# Patient Record
Sex: MALE | Race: WHITE | NOT HISPANIC OR LATINO | Employment: FULL TIME | URBAN - NONMETROPOLITAN AREA
[De-identification: names, ages, dates, MRNs, and addresses within clinical notes are randomized per-mention and may not be internally consistent; named-entity substitution may affect disease eponyms.]

---

## 2023-06-19 ENCOUNTER — APPOINTMENT (OUTPATIENT)
Dept: PRIMARY CARE | Facility: CLINIC | Age: 53
End: 2023-06-19

## 2024-09-23 ENCOUNTER — OFFICE VISIT (OUTPATIENT)
Dept: URGENT CARE | Facility: URGENT CARE | Age: 54
End: 2024-09-23
Payer: COMMERCIAL

## 2024-09-23 VITALS
RESPIRATION RATE: 18 BRPM | DIASTOLIC BLOOD PRESSURE: 92 MMHG | BODY MASS INDEX: 30.37 KG/M2 | WEIGHT: 208.56 LBS | SYSTOLIC BLOOD PRESSURE: 146 MMHG | HEART RATE: 94 BPM | OXYGEN SATURATION: 100 % | TEMPERATURE: 98.8 F

## 2024-09-23 DIAGNOSIS — J01.00 ACUTE NON-RECURRENT MAXILLARY SINUSITIS: ICD-10-CM

## 2024-09-23 DIAGNOSIS — J40 BRONCHITIS: Primary | ICD-10-CM

## 2024-09-23 DIAGNOSIS — I10 HYPERTENSION, UNSPECIFIED TYPE: ICD-10-CM

## 2024-09-23 RX ORDER — TRAZODONE HYDROCHLORIDE 50 MG/1
50-100 TABLET ORAL NIGHTLY
COMMUNITY

## 2024-09-23 RX ORDER — AMOXICILLIN 875 MG/1
875 TABLET, FILM COATED ORAL 2 TIMES DAILY
Qty: 20 TABLET | Refills: 0 | Status: SHIPPED | OUTPATIENT
Start: 2024-09-23 | End: 2024-10-03

## 2024-09-23 RX ORDER — DIVALPROEX SODIUM 250 MG/1
TABLET, FILM COATED, EXTENDED RELEASE ORAL
COMMUNITY

## 2024-09-23 NOTE — PROGRESS NOTES
Subjective   Patient ID: Derek Ardon is a 54 y.o. male. They present today with a chief complaint of Cough (X 1 week).    History of Present Illness  HPI  Pt presents for persistent dry cough day and night, occasional SOB with spasms of cough. No wheeze or CP or fever or N/V/D. Pt has tried OTC advil cold and sinus, dayquil, nyquil with temporary relief.     Pt reports hx of hypertension stopped lisinopril 2-3 years ago due to dry cough. He has not gone back to PCP to discuss HTN management. He is taking care of his 93yo father and teenage children with increased stress in his life. He denies tobacco or alcohol use.    Past Medical History  Allergies as of 09/23/2024    (No Known Allergies)       (Not in a hospital admission)       Past Medical History:   Diagnosis Date    Bipolar disorder, current episode manic without psychotic features, unspecified (Multi)     Bipolar disorder, current episode manic without psychotic features    Body mass index (BMI) 31.0-31.9, adult 06/20/2022    BMI 31.0-31.9,adult    Obesity, unspecified 04/21/2022    Class 1 obesity with body mass index (BMI) of 30.0 to 30.9 in adult    Other conditions influencing health status     Herniated disc       Past Surgical History:   Procedure Laterality Date    OTHER SURGICAL HISTORY  10/05/2015    Excision Of Baker's Cyst            Review of Systems  Review of Systems                               Objective    Vitals:    09/23/24 1428   BP: (!) 146/92   Pulse: 94   Resp: 18   Temp: 37.1 °C (98.8 °F)   SpO2: 100%   Weight: 94.6 kg (208 lb 8.9 oz)     No LMP for male patient.    Physical Exam  Vitals and nursing note reviewed.   Constitutional:       Appearance: Normal appearance.      Comments: Pleasant white male, obese body habitus   HENT:      Head: Normocephalic and atraumatic.      Right Ear: Tympanic membrane, ear canal and external ear normal.      Left Ear: Tympanic membrane, ear canal and external ear normal.      Nose: Congestion and  rhinorrhea present.      Mouth/Throat:      Mouth: Mucous membranes are moist.      Pharynx: Posterior oropharyngeal erythema present.      Comments: Tonsils absent  Eyes:      Extraocular Movements: Extraocular movements intact.      Conjunctiva/sclera: Conjunctivae normal.      Pupils: Pupils are equal, round, and reactive to light.   Cardiovascular:      Rate and Rhythm: Normal rate and regular rhythm.      Heart sounds: No murmur heard.  Pulmonary:      Effort: Pulmonary effort is normal.      Breath sounds: Normal breath sounds.   Musculoskeletal:         General: Normal range of motion.      Cervical back: Normal range of motion and neck supple.   Skin:     General: Skin is warm and dry.      Findings: No rash.   Neurological:      General: No focal deficit present.      Mental Status: He is alert and oriented to person, place, and time.   Psychiatric:         Mood and Affect: Mood normal.         Procedures    Point of Care Test & Imaging Results from this visit  No results found for this visit on 09/23/24.   No results found.    Diagnostic study results (if any) were reviewed by Amber Fairbanks PA-C.    Assessment/Plan   Allergies, medications, history, and pertinent labs/EKGs/Imaging reviewed by Amber Fairbanks PA-C.     Orders and Diagnoses  Diagnoses and all orders for this visit:  Bronchitis  -Reviewed supportive care with otc expectorant such as coricidin HBP, increase in fluids and vicks chest rub.  -Return if not improving for CXR. GO TO ER if worsening difficulty breathing.  Acute non-recurrent maxillary sinusitis  -     amoxicillin (Amoxil) 875 mg tablet; Take 1 tablet (875 mg) by mouth 2 times a day for 10 days.   -Advised to take with food and probiotic. Trial of flonase nasal spray 1 spray twice a day x 3-7 days  Hypertension, unspecified type   - reviewed lifestyle modification with stress management, low sodium diet and follow up with PCP for med management      Patient  disposition: Home    Electronically signed by Amber Fairbanks PA-C  3:00 PM

## 2024-09-23 NOTE — PATIENT INSTRUCTIONS
Acute sinusitis- Start Amoxicillin twice a day x 10 days, take with food and probiotic. Return for another course of antibiotic if nasal congestion not completely resolved.     Bronchitis- Recommend OTC expectorant (Coricidin HBP if elevated BP) or Mucinex or Robitussin with plenty of fluids. Trial of Flonase 1 spray each nostril twice a day x 3-7 days.     Sore throat- Recommend warm salt water gargles, saline nasal spray every 2 hours as needed congestion, Humidifier, Vicks Chest RUB

## 2024-09-28 ENCOUNTER — OFFICE VISIT (OUTPATIENT)
Dept: URGENT CARE | Facility: URGENT CARE | Age: 54
End: 2024-09-28
Payer: COMMERCIAL

## 2024-09-28 ENCOUNTER — HOSPITAL ENCOUNTER (OUTPATIENT)
Dept: RADIOLOGY | Facility: CLINIC | Age: 54
Discharge: HOME | End: 2024-09-28
Payer: COMMERCIAL

## 2024-09-28 VITALS
WEIGHT: 210.76 LBS | TEMPERATURE: 98.7 F | BODY MASS INDEX: 30.69 KG/M2 | SYSTOLIC BLOOD PRESSURE: 138 MMHG | OXYGEN SATURATION: 98 % | DIASTOLIC BLOOD PRESSURE: 90 MMHG | HEART RATE: 99 BPM | RESPIRATION RATE: 18 BRPM

## 2024-09-28 DIAGNOSIS — J20.9 BRONCHOSPASM WITH BRONCHITIS, ACUTE: Primary | ICD-10-CM

## 2024-09-28 DIAGNOSIS — J01.00 ACUTE NON-RECURRENT MAXILLARY SINUSITIS: ICD-10-CM

## 2024-09-28 DIAGNOSIS — J20.9 BRONCHOSPASM WITH BRONCHITIS, ACUTE: ICD-10-CM

## 2024-09-28 DIAGNOSIS — H10.13 ALLERGIC CONJUNCTIVITIS OF BOTH EYES: ICD-10-CM

## 2024-09-28 PROCEDURE — 71046 X-RAY EXAM CHEST 2 VIEWS: CPT | Performed by: RADIOLOGY

## 2024-09-28 PROCEDURE — 71046 X-RAY EXAM CHEST 2 VIEWS: CPT

## 2024-09-28 RX ORDER — METHYLPREDNISOLONE 4 MG/1
TABLET ORAL
Qty: 21 TABLET | Refills: 0 | Status: SHIPPED | OUTPATIENT
Start: 2024-09-28 | End: 2024-10-05

## 2024-09-28 RX ORDER — OLOPATADINE HYDROCHLORIDE 1 MG/ML
1 SOLUTION/ DROPS OPHTHALMIC 2 TIMES DAILY PRN
Qty: 5 ML | Refills: 0 | Status: SHIPPED | OUTPATIENT
Start: 2024-09-28 | End: 2025-01-26

## 2024-09-28 RX ORDER — ALBUTEROL SULFATE 90 UG/1
2 INHALANT RESPIRATORY (INHALATION) EVERY 6 HOURS PRN
Qty: 8.5 G | Refills: 0 | Status: SHIPPED | OUTPATIENT
Start: 2024-09-28 | End: 2025-09-28

## 2024-09-28 RX ORDER — AMOXICILLIN AND CLAVULANATE POTASSIUM 875; 125 MG/1; MG/1
1 TABLET, FILM COATED ORAL 2 TIMES DAILY
Qty: 20 TABLET | Refills: 0 | Status: SHIPPED | OUTPATIENT
Start: 2024-09-28 | End: 2024-10-08

## 2024-09-28 RX ORDER — ALBUTEROL SULFATE 0.83 MG/ML
2.5 SOLUTION RESPIRATORY (INHALATION) ONCE
Status: COMPLETED | OUTPATIENT
Start: 2024-09-28 | End: 2024-09-28

## 2024-09-28 ASSESSMENT — PAIN SCALES - GENERAL: PAINLEVEL: 0-NO PAIN

## 2024-09-28 NOTE — PATIENT INSTRUCTIONS
Acute Bronchospasm with Bronchitis- Albuterol neb once. Start Albuterol inhaler 2 puffs every 6 hours x 24 then as needed wheeze or cough, use with spacer (4 breaths between each puff). Start Medrol dose isaiah as directed, take with food. Avoid NSAIDS while on Medrol dose isaiah due to increased risk for elevated BP. Monitor BP daily, If BP>170/100 stop steroid taper and follow up with PCP. Chest xray ordered due to worsening cough.      Acute Sinusitis- Start Augmentin twice a day x 10 days, stop Amoxicillin 875mg. Recommend saline nasal spray, humidifier, vicks chest rub.    Acute allergy eyes- Start Pataday eye drop 1 drop each eye twice a day x 3 days then as needed for itchy eyes. Recommend otc zyrtec 10mg daily x 1 week. May use OTC vaseline and rub around eyes to help with irritation.

## 2024-09-28 NOTE — PROGRESS NOTES
Subjective   Patient ID: Derek Ardon is a 54 y.o. male. They present today with a chief complaint of Sore Throat, Cough, and Other (Pt. C/O chest congestion, SOB, cough, and yellow-mucus. /Was here 6 days ago./On Amox. BID-ordered for 10 days. ).    History of Present Illness  HPI  Pt was seen on 9/23/24 for similar sx of spasmatic cough, congestion. He was prescribed Amoxicillin 875mg BID and reports no relief of symptoms. He reports cough now is more productive with yellow sputum, SOB with coughing spasms and with exertion. He has noticed a wheeze at bedtime. No fever or vomiting in last 6 days since starting antibiotic. He has tried otc delsym cough syrup  alkaseltzer plus with temporary relief.     Past Medical History  Allergies as of 09/28/2024    (No Known Allergies)       (Not in a hospital admission)       Past Medical History:   Diagnosis Date    Bipolar disorder, current episode manic without psychotic features, unspecified (Multi)     Bipolar disorder, current episode manic without psychotic features    Body mass index (BMI) 31.0-31.9, adult 06/20/2022    BMI 31.0-31.9,adult    Obesity, unspecified 04/21/2022    Class 1 obesity with body mass index (BMI) of 30.0 to 30.9 in adult    Other conditions influencing health status     Herniated disc       Past Surgical History:   Procedure Laterality Date    OTHER SURGICAL HISTORY  10/05/2015    Excision Of Baker's Cyst        reports that he has never smoked. He has never been exposed to tobacco smoke. He has never used smokeless tobacco. He reports that he does not drink alcohol and does not use drugs.    Review of Systems  Review of Systems                               Objective    Vitals:    09/28/24 1018   BP: 138/90   BP Location: Left arm   Patient Position: Sitting   BP Cuff Size: Large adult   Pulse: 99   Resp: 18   Temp: 37.1 °C (98.7 °F)   TempSrc: Oral   SpO2: 97%   Weight: 95.6 kg (210 lb 12.2 oz)     No LMP for male patient.    Physical  Exam  Vitals and nursing note reviewed.   Constitutional:       Appearance: Normal appearance.      Comments: Pleasant white male, obese body habitus   HENT:      Head: Normocephalic and atraumatic.      Right Ear: Tympanic membrane, ear canal and external ear normal.      Left Ear: Tympanic membrane, ear canal and external ear normal.      Nose: Congestion and rhinorrhea present.      Mouth/Throat:      Mouth: Mucous membranes are moist.      Pharynx: Posterior oropharyngeal erythema present.   Eyes:      Extraocular Movements: Extraocular movements intact.      Pupils: Pupils are equal, round, and reactive to light.      Comments: Irritated conjunctiva bilaterally, mildly swollen skin under both eyes   Cardiovascular:      Rate and Rhythm: Normal rate and regular rhythm.      Heart sounds: No murmur heard.  Pulmonary:      Effort: Pulmonary effort is normal.      Comments: Spasmatic cough with deep breaths, diffuse expiratory wheezes with severely diminished breath sounds. Pt able to talk in full sentences without pausing to catch his breath. Preneb O2 97%, postneb O2 98% with improved aeration and faint wheezes, diminished breath sounds RLL.  Musculoskeletal:         General: Normal range of motion.      Cervical back: Normal range of motion and neck supple.   Lymphadenopathy:      Cervical: No cervical adenopathy.   Skin:     General: Skin is warm and dry.   Neurological:      General: No focal deficit present.      Mental Status: He is alert and oriented to person, place, and time.   Psychiatric:         Mood and Affect: Mood normal.         Procedures    Point of Care Test & Imaging Results from this visit  No results found for this visit on 09/28/24.   No results found.    Diagnostic study results (if any) were reviewed by Amber Fairbanks PA-C.    Assessment/Plan   Allergies, medications, history, and pertinent labs/EKGs/Imaging reviewed by Amber Fairbanks PA-C.     Orders and  Diagnoses  Diagnoses and all orders for this visit:  Bronchospasm with bronchitis, acute  -     methylPREDNISolone (Medrol Dospak) 4 mg tablets; Take as directed on package.  -     albuterol (ProAir HFA) 90 mcg/actuation inhaler; Inhale 2 puffs every 6 hours if needed for wheezing or shortness of breath. Use with spacer  -     inhalational spacing device inhaler; Use as directed with inhalers; instruct on proper use  -     XR chest 2 views; Future  -     albuterol 2.5 mg /3 mL (0.083 %) nebulizer solution 2.5 mg  Allergic conjunctivitis of both eyes  -     olopatadine (Patanol) 0.1 % ophthalmic solution; Administer 1 drop into both eyes 2 times a day as needed for allergies.  Acute non-recurrent maxillary sinusitis  -     amoxicillin-pot clavulanate (Augmentin) 875-125 mg tablet; Take 1 tablet by mouth 2 times a day for 10 days.    53 yo M presents for worsening congestion, cough productive yellow sputum with dyspnea and not improving on Amoxicillin 875mg BID. Discussed treatment of bronchospasm with Albuterol neb, advised to continue Albuterol inhaler with spacer 2 puffs every 6 hr x 24 hr then as needed for cough or wheeze. Start Medrol dose isaiah as directed, take with food. Avoid NSAIDS when taking Medrol dose isaiah. Reviewed chest xray negative for pneumonia. Discussed treatment of acute sinusitis with Augmentin BID x 10 days, advised to stop Amoxicillin. Discussed treatment of allergy eyes with Pataday 1 drop twice a day x 3 days then as needed for itchy eyes. Start Zyrtec 10mg daily x 1 week.   Administrations This Visit       albuterol 2.5 mg /3 mL (0.083 %) nebulizer solution 2.5 mg       Admin Date  09/28/2024 Action  Given Dose  2.5 mg Route  nebulization Documented By  Hemalatha Tristan MA                    Patient disposition: Home    Electronically signed by Amber Fairbanks PA-C  11:56 AM

## 2024-10-14 ENCOUNTER — OFFICE VISIT (OUTPATIENT)
Dept: PRIMARY CARE | Facility: CLINIC | Age: 54
End: 2024-10-14
Payer: COMMERCIAL

## 2024-10-14 VITALS
DIASTOLIC BLOOD PRESSURE: 87 MMHG | HEART RATE: 99 BPM | WEIGHT: 210.2 LBS | TEMPERATURE: 98.4 F | BODY MASS INDEX: 30.61 KG/M2 | SYSTOLIC BLOOD PRESSURE: 143 MMHG

## 2024-10-14 DIAGNOSIS — J40 BRONCHITIS: Primary | ICD-10-CM

## 2024-10-14 PROCEDURE — 1036F TOBACCO NON-USER: CPT | Performed by: REGISTERED NURSE

## 2024-10-14 PROCEDURE — 99213 OFFICE O/P EST LOW 20 MIN: CPT | Performed by: REGISTERED NURSE

## 2024-10-14 RX ORDER — DOXYCYCLINE 100 MG/1
100 CAPSULE ORAL 2 TIMES DAILY
Qty: 14 CAPSULE | Refills: 0 | Status: SHIPPED | OUTPATIENT
Start: 2024-10-14 | End: 2024-10-21

## 2024-10-14 RX ORDER — BENZONATATE 100 MG/1
100 CAPSULE ORAL 3 TIMES DAILY PRN
Qty: 42 CAPSULE | Refills: 0 | Status: SHIPPED | OUTPATIENT
Start: 2024-10-14 | End: 2024-11-13

## 2024-10-14 NOTE — PROGRESS NOTES
Subjective   Patient ID: Derek Ardon is a 54 y.o. male who presents for Sick Visit (Cough, both ears feel clogged as well; this has lasted for at least a month; denies fevers, aches, chills, no sinus or chest congestion;).    HPI     Cough: Started about a month ago, denies fevers, chills, body aches. Denies sinus. Ears feel clogged. Denies loss of appetitive. Feeling fatigued. 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 Denies sinus pressure, Started in his chest. Going up into his head. Has tried antihistamines and decongestant with cough syrup. He is brining up mucus. Thick  yellow. He takes loratadine. Tried Augmentin and prednisone and albuterol inhaler. Finished these about a week ago. Did not seem to do much to help.       All other systems reviewed and negative for complaint unless stated above.    Review of Systems    Objective   /87 (BP Location: Left arm, Patient Position: Sitting, BP Cuff Size: Large adult)   Pulse 99   Temp 36.9 °C (98.4 °F)   Wt 95.3 kg (210 lb 3.2 oz)   BMI 30.61 kg/m²     Physical Exam  Constitutional:       Appearance: Normal appearance.   Cardiovascular:      Rate and Rhythm: Normal rate and regular rhythm.   Pulmonary:      Effort: Pulmonary effort is normal.      Breath sounds: Normal breath sounds.   Skin:     General: Skin is warm and dry.   Neurological:      Mental Status: He is alert and oriented to person, place, and time. Mental status is at baseline.   Psychiatric:         Mood and Affect: Mood normal.         Behavior: Behavior normal.         Assessment/Plan   Diagnoses and all orders for this visit:  Bronchitis  -     doxycycline (Vibramycin) 100 mg capsule; Take 1 capsule (100 mg) by mouth 2 times a day for 7 days. Take with at least 8 ounces (large glass) of water, do not lie down for 30 minutes after  -     benzonatate (Tessalon) 100 mg capsule; Take 1 capsule (100 mg) by mouth 3 times a day as needed for cough. Do not crush or chew.     - if no  improvement in 1 week discussed possible repeat xray

## 2024-11-02 ENCOUNTER — OFFICE VISIT (OUTPATIENT)
Dept: URGENT CARE | Facility: URGENT CARE | Age: 54
End: 2024-11-02
Payer: COMMERCIAL

## 2024-11-02 VITALS
OXYGEN SATURATION: 97 % | TEMPERATURE: 98.8 F | HEART RATE: 118 BPM | WEIGHT: 207.23 LBS | HEIGHT: 69 IN | BODY MASS INDEX: 30.69 KG/M2 | DIASTOLIC BLOOD PRESSURE: 90 MMHG | SYSTOLIC BLOOD PRESSURE: 146 MMHG | RESPIRATION RATE: 18 BRPM

## 2024-11-02 DIAGNOSIS — R05.1 ACUTE COUGH: ICD-10-CM

## 2024-11-02 DIAGNOSIS — J20.9 ACUTE BRONCHITIS, UNSPECIFIED ORGANISM: ICD-10-CM

## 2024-11-02 DIAGNOSIS — H65.02 ACUTE SEROUS OTITIS MEDIA OF LEFT EAR, RECURRENCE NOT SPECIFIED: Primary | ICD-10-CM

## 2024-11-02 DIAGNOSIS — H10.023 ACUTE PURULENT CONJUNCTIVITIS OF BOTH EYES: ICD-10-CM

## 2024-11-02 RX ORDER — AMOXICILLIN 875 MG/1
875 TABLET, FILM COATED ORAL 2 TIMES DAILY
Qty: 20 TABLET | Refills: 0 | Status: SHIPPED | OUTPATIENT
Start: 2024-11-02 | End: 2024-11-12

## 2024-11-02 RX ORDER — GENTAMICIN SULFATE 3 MG/ML
2 SOLUTION/ DROPS OPHTHALMIC EVERY 4 HOURS
Qty: 5 ML | Refills: 0 | Status: SHIPPED | OUTPATIENT
Start: 2024-11-02 | End: 2024-11-07

## 2024-11-02 ASSESSMENT — ENCOUNTER SYMPTOMS
FEVER: 0
WHEEZING: 1
HEADACHES: 1
CONSTIPATION: 0
DYSURIA: 0
RHINORRHEA: 1
COUGH: 1
NAUSEA: 0
SINUS PRESSURE: 1
PALPITATIONS: 0
VOMITING: 0
CHEST TIGHTNESS: 0
SHORTNESS OF BREATH: 1
DIARRHEA: 0
ABDOMINAL PAIN: 0
SORE THROAT: 0
CHILLS: 0
FREQUENCY: 0

## 2024-11-05 LAB — POC SARS-COV-2 AG BINAX: NORMAL
